# Patient Record
Sex: FEMALE | Race: WHITE
[De-identification: names, ages, dates, MRNs, and addresses within clinical notes are randomized per-mention and may not be internally consistent; named-entity substitution may affect disease eponyms.]

---

## 2019-03-06 ENCOUNTER — HOSPITAL ENCOUNTER (EMERGENCY)
Dept: HOSPITAL 75 - ER FS | Age: 79
LOS: 1 days | Discharge: HOME | End: 2019-03-07
Payer: MEDICARE

## 2019-03-06 VITALS — BODY MASS INDEX: 20.62 KG/M2 | WEIGHT: 105 LBS | HEIGHT: 60 IN

## 2019-03-06 DIAGNOSIS — E87.2: ICD-10-CM

## 2019-03-06 DIAGNOSIS — N39.0: Primary | ICD-10-CM

## 2019-03-06 DIAGNOSIS — F03.90: ICD-10-CM

## 2019-03-06 DIAGNOSIS — J40: ICD-10-CM

## 2019-03-06 PROCEDURE — 81000 URINALYSIS NONAUTO W/SCOPE: CPT

## 2019-03-06 PROCEDURE — 80053 COMPREHEN METABOLIC PANEL: CPT

## 2019-03-06 PROCEDURE — 71250 CT THORAX DX C-: CPT

## 2019-03-06 PROCEDURE — 85027 COMPLETE CBC AUTOMATED: CPT

## 2019-03-06 PROCEDURE — 87804 INFLUENZA ASSAY W/OPTIC: CPT

## 2019-03-06 PROCEDURE — 85730 THROMBOPLASTIN TIME PARTIAL: CPT

## 2019-03-06 PROCEDURE — 83605 ASSAY OF LACTIC ACID: CPT

## 2019-03-06 PROCEDURE — 85610 PROTHROMBIN TIME: CPT

## 2019-03-06 PROCEDURE — 85007 BL SMEAR W/DIFF WBC COUNT: CPT

## 2019-03-06 PROCEDURE — 71045 X-RAY EXAM CHEST 1 VIEW: CPT

## 2019-03-06 PROCEDURE — 36415 COLL VENOUS BLD VENIPUNCTURE: CPT

## 2019-03-06 PROCEDURE — 87088 URINE BACTERIA CULTURE: CPT

## 2019-03-06 PROCEDURE — 87040 BLOOD CULTURE FOR BACTERIA: CPT

## 2019-03-06 PROCEDURE — 51701 INSERT BLADDER CATHETER: CPT

## 2019-03-06 NOTE — ED GENERAL
General


Chief Complaint:  Cough/Cold/Flu Symptoms


Stated Complaint:  FEVER


Nursing Triage Note:  


pt sent from nursing home per ems for cough and fever, pt wtih hx of dementia 


and is confused but verbal.   at bedside and is able to answer questions


Nursing Sepsis Screen:  Possible Sepsis Risk


Exam Limitations:  Other (baseline dementia)





History of Present Illness


Date Seen by Provider:  Mar 6, 2019


Time Seen by Provider:  23:20


This is an 79-year-old female with a history of severe dementia who is brought 

to the emergency department from her nursing home for fever and cough first 

reported today.  Patient cannot provide any history herself due to baseline 

dementia. No other symptoms were reported to us however.





Allergies and Home Medications


Allergies


Coded Allergies:  


     No Known Drug Allergies (Unverified , 3/6/19)





Home Medications


Acetaminophen 325 Mg Tablet, PRN, (Reported)


Guaifenesin 400 Mg Tablet, 400 MG PO Q4H PRN for COUGH


   Prescribed by: AGUS WATT on 3/7/19 0208


Levofloxacin 750 Mg Tablet, 750 MG PO DAILY


   Prescribed by: AGUS WATT on 3/7/19 0208


Ondansetron HCl 4 Mg Tab, PRN, (Reported)


Oseltamivir Phosphate 75 Mg Cap, 75 MG PO BID


   Prescribed by: AGUS WATT on 3/7/19 0208


Polyethylene Glycol 3350 17 Gm Powd.pack, DAILY, (Reported)


Promethazine Hcl 12.5 Mg Tablet, PRN, (Reported)





Patient Home Medication List


Home Medication List Reviewed:  Yes





Review of Systems


Review of Systems


Constitutional:  other (review of systems is not obtainable secondary to 

dementia)





Past Medical-Social-Family Hx


Patient Social History


Recent Foreign Travel:  No


Contact w/Someone Who Travel:  No


Recent Infectious Disease Expo:  No


Physical Abuse:  No


Sexual Abuse:  No


Mistreated:  No


Fear:  No





Physical Exam


Vital Signs





Vital Signs - First Documented








 3/6/19 3/6/19





 23:15 23:26


 


Temp  99.6


 


Pulse  107


 


Resp  18


 


B/P (MAP)  150/79 (102)


 


Pulse Ox  95


 


O2 Delivery Room Air 





Capillary Refill : Less Than 3 Seconds


Height, Weight, BMI


Height: 5'"


Weight: 105lbs. oz. 47.285013kb;  BMI


Method:Stated


General Appearance:  No Apparent Distress


HEENT:  Normal ENT Inspection


Neck:  Supple; No JVD


Respiratory:  Other (mild coarse breath sounds bilaterally)


Cardiovascular:  Regular Rate, Rhythm, Normal Peripheral Pulses


Gastrointestinal:  Non Tender, Soft


Extremity:  Pedal Edema (mild, symmetrical)


Neurologic/Psychiatric:  Other (awake. Unable to cooperate with thorough 

neurologic testing however moves all 4 extremities grossly symmetrically, 

wiggles her toes on both sides after asking multiple times, no facial droop, 

says "no" when pressing on the skin of both feet, sensation appears to be 

intact symmetrically)


Skin:  Warm/Dry





Focused Exam


Lactate Level


3/6/19 00:05: Lactic Acid Level 2.15*H


3/7/19 01:54: Lactic Acid Level 2.10*H





Lactic Acid Level





Laboratory Tests








Test


 3/7/19


01:54


 


Lactic Acid Level


 2.10 MMOL/L


(0.50-2.00)  *H











Progress/Results/Core Measures


Suspected Sepsis


Recent Fever Within 48 Hours:  Yes


Infection Criteria Present:  Suspected New Infection


New/Unexplained  Altered Menta:  No


Sepsis Screen:  Possible Sepsis Risk


SIRS


Temperature:99.6 


Pulse: 107 


Respiratory Rate: 18


 


Laboratory Tests


3/6/19 00:05: White Blood Count 8.9


Blood Pressure 150 /79 


Mean: 102


 





3/6/19 00:05: Lactic Acid Level 2.15*H


3/7/19 01:54: Lactic Acid Level 2.10*H


Laboratory Tests


3/6/19 00:05: 


Creatinine 0.48L, INR Comment 1.1, Platelet Count 236, Total Bilirubin 0.2








Results/Orders


Lab Results





Laboratory Tests








Test


 3/6/19


00:05 3/6/19


00:45 3/7/19


01:54 Range/Units


 


 


White Blood Count


 8.9 


 


 


 4.3-11.0


10^3/uL


 


Red Blood Count


 3.55 L


 


 


 4.35-5.85


10^6/uL


 


Hemoglobin 10.8 L   11.5-16.0  G/DL


 


Hematocrit 34 L   35-52  %


 


Mean Corpuscular Volume 94    80-99  FL


 


Mean Corpuscular Hemoglobin 30    25-34  PG


 


Mean Corpuscular Hemoglobin


Concent 32 


 


 


 32-36  G/DL





 


Red Cell Distribution Width 14.4    10.0-14.5  %


 


Platelet Count


 236 


 


 


 130-400


10^3/uL


 


Mean Platelet Volume 9.2    7.4-10.4  FL


 


Neutrophils (%) (Auto) 84 H   42-75  %


 


Lymphocytes (%) (Auto) 7 L   12-44  %


 


Monocytes (%) (Auto) 7    0-12  %


 


Eosinophils (%) (Auto) 0    0-10  %


 


Basophils (%) (Auto) 0    0-10  %


 


Neutrophils # (Auto) 7.5    1.8-7.8  X 10^3


 


Lymphocytes # (Auto) 0.7 L   1.0-4.0  X 10^3


 


Monocytes # (Auto) 0.6    0.0-1.0  X 10^3


 


Eosinophils # (Auto)


 0.0 


 


 


 0.0-0.3


10^3/uL


 


Basophils # (Auto)


 0.0 


 


 


 0.0-0.1


10^3/uL


 


Neutrophils % (Manual) 73     %


 


Lymphocytes % (Manual) 8     %


 


Monocytes % (Manual) 5     %


 


Eosinophils % (Manual) 1     %


 


Basophils % (Manual) 0     %


 


Metamyelocytes % 1     %


 


Myelocytes % 1     %


 


Band Neutrophils 11     %


 


Prothrombin Time 13.8    12.2-14.7  SEC


 


INR Comment 1.1    0.8-1.4  


 


Activated Partial


Thromboplast Time 33 


 


 


 24-35  SEC





 


Sodium Level 140    135-145  MMOL/L


 


Potassium Level 3.7    3.6-5.0  MMOL/L


 


Chloride Level 102      MMOL/L


 


Carbon Dioxide Level 25    21-32  MMOL/L


 


Anion Gap 13    5-14  MMOL/L


 


Blood Urea Nitrogen 15    7-18  MG/DL


 


Creatinine


 0.48 L


 


 


 0.60-1.30


MG/DL


 


Estimat Glomerular Filtration


Rate > 60 


 


 


  





 


BUN/Creatinine Ratio 31     


 


Glucose Level 171 H     MG/DL


 


Lactic Acid Level


 2.15 *H


 


 2.10 *H


 0.50-2.00


MMOL/L


 


Calcium Level 8.6    8.5-10.1  MG/DL


 


Corrected Calcium 9.0    8.5-10.1  MG/DL


 


Total Bilirubin 0.2    0.1-1.0  MG/DL


 


Aspartate Amino Transf


(AST/SGOT) 13 


 


 


 5-34  U/L





 


Alanine Aminotransferase


(ALT/SGPT) 8 


 


 


 0-55  U/L





 


Alkaline Phosphatase 51      U/L


 


Total Protein 6.2 L   6.4-8.2  GM/DL


 


Albumin 3.5    3.2-4.5  GM/DL


 


Urine Color  YELLOW    


 


Urine Clarity  SL CLOUDY    


 


Urine pH  7.5   5-9  


 


Urine Specific Gravity  1.015 L  1.016-1.022  


 


Urine Protein  1+ H  NEGATIVE  


 


Urine Glucose (UA)  NEGATIVE   NEGATIVE  


 


Urine Ketones  TRACE H  NEGATIVE  


 


Urine Nitrite  NEGATIVE   NEGATIVE  


 


Urine Bilirubin  NEGATIVE   NEGATIVE  


 


Urine Urobilinogen  0.2   NORMAL  MG/DL


 


Urine Leukocyte Esterase  NEGATIVE   NEGATIVE  


 


Urine RBC (Auto)  2+ H  NEGATIVE  


 


Urine RBC  5-10 H   /HPF


 


Urine WBC  5-10 H   /HPF


 


Urine Squamous Epithelial


Cells 


 5-10 


 


  /HPF





 


Urine Crystals  NONE    /LPF


 


Urine Bacteria  LARGE H   /HPF


 


Urine Casts  NONE    /LPF


 


Urine Mucus  NONE    /LPF


 


Urine Culture Indicated  NO    








Micro Results





Microbiology


3/6/19 Influenza Types A,B Antigen (ABY) - Final, Complete


         





My Orders





Orders - AGUS WATT DO


Cbc With Automated Diff (3/6/19 23:28)


Comprehensive Metabolic Panel (3/6/19 23:28)


Blood Culture (3/6/19 23:28)


Urinalysis (3/6/19 23:28)


Urine Culture (3/6/19 23:28)


Protime With Inr (3/6/19 23:28)


Partial Thromboplastin Time (3/6/19 23:28)


Chest 1 View Ap/Pa Only (3/6/19 23:28)


Saline Lock/Iv-Start (3/6/19 23:28)


Saline Lock/Iv-Start (3/6/19 23:28)


Vital Signs Adult Sepsis Patie Q15M (3/6/19 23:28)


O2 (3/6/19 23:28)


Remove Rings In Anticipation O (3/6/19 23:28)


Lactic Acid Analyzer (3/6/19 23:28)


Influenza A And B Antigens (3/6/19 23:28)


Ns Iv 1000 Ml (Sodium Chloride 0.9%) (3/6/19 23:30)


Cefepime Injection (Maxipime Injection) (3/6/19 23:30)


Vancomycin Injection (Vancomycin Injecti (3/6/19 23:30)


Ct Chest Wo (3/7/19 00:51)


Manual Differential (3/6/19 00:05)


Oseltamivir  75 Mg Capsule (Tamiflu  75 (3/7/19 02:01)


Oseltamivir  75 Mg Capsule (Tamiflu  75 (3/7/19 02:10)





Medications Given in ED





Current Medications








 Medications  Dose


 Ordered  Sig/Haile


 Route  Start Time


 Stop Time Status Last Admin


Dose Admin


 


 Cefepime HCl 1000


 mg/Sterile Water  10 ml @ 


 200 mls/hr  ONCE  ONCE


 IV  3/6/19 23:30


 3/6/19 23:32 DC 3/7/19 00:35


200 MLS/HR


 


 Sodium Chloride  2,100 ml @ 


 2,100 mls/hr  ONCE  ONCE


 IV  3/6/19 23:30


 3/7/19 00:29 DC 3/7/19 00:35


2,100 MLS/HR


 


 Vancomycin HCl


 1000 mg/Sodium


 Chloride  250 ml @ 


 250 mls/hr  ONCE  ONCE


 IV  3/6/19 23:30


 3/7/19 00:29 DC 3/7/19 00:35


250 MLS/HR








Vital Signs/I&O











 3/6/19 3/6/19





 23:15 23:26


 


Temp  99.6


 


Pulse  107


 


Resp  18


 


B/P (MAP)  150/79 (102)


 


Pulse Ox  95


 


O2 Delivery Room Air Room Air





Capillary Refill : Less Than 3 Seconds








Blood Pressure Mean:  102








Progress Note :  


Progress Note


Patient is at risk for multidrug resistant organisms, she had a fever prior to 

arrival, she had a borderline temperature here although we did not obtain a 

rectal temperature. She has no leukocytosis but she does have a left shift. 

Lactic acid returned elevated greater than 2 but less than 4. Chest x-ray was 

abnormal, CT of the chest does not show evidence of pneumonia, however there is 

evidence of a urinary tract infection and I would empirically treat for 

influenza. Pt's  requests dc back to nursing home.  Will start empiric 

tamiflu and will treat with levaquin for UTI so as to cross cover for possible 

bacterial component to cough.





Departure


Impression





 Primary Impression:  


 UTI (urinary tract infection)


 Additional Impressions:  


 Lactic acidosis


 Bronchitis


Disposition:  01 HOME, SELF-CARE


Condition:  Stable





Departure-Patient Inst.


Referrals:  


NO,LOCAL PHYSICIAN (PCP)


Primary Care Physician


Patient Instructions:  Acute Bronchitis, Adult (DC)


Scripts


Guaifenesin (Guaifenesin) 400 Mg Tablet


400 MG PO Q4H PRN for COUGH for 7 Days, #30 TAB


   Prov: AGUS WATT DO         3/7/19 


Oseltamivir Phosphate (Tamiflu) 75 Mg Cap


75 MG PO BID for 5 Days, #9 CAP


   Prov: AGUS WATT DO         3/7/19 


Levofloxacin (Levofloxacin) 750 Mg Tablet


750 MG PO DAILY for 5 Days, #5 TAB


   Prov: AGUS WATT DO         3/7/19











AGUS WATT DO Mar 6, 2019 23:55

## 2019-03-07 VITALS — DIASTOLIC BLOOD PRESSURE: 71 MMHG | SYSTOLIC BLOOD PRESSURE: 129 MMHG

## 2019-03-07 LAB
ALBUMIN SERPL-MCNC: 3.5 GM/DL (ref 3.2–4.5)
ALP SERPL-CCNC: 51 U/L (ref 40–136)
ALT SERPL-CCNC: 8 U/L (ref 0–55)
APTT BLD: 33 SEC (ref 24–35)
APTT PPP: YELLOW S
BACTERIA #/AREA URNS HPF: (no result) /HPF
BASOPHILS # BLD AUTO: 0 10^3/UL (ref 0–0.1)
BASOPHILS NFR BLD AUTO: 0 % (ref 0–10)
BASOPHILS NFR BLD MANUAL: 0 %
BILIRUB SERPL-MCNC: 0.2 MG/DL (ref 0.1–1)
BILIRUB UR QL STRIP: NEGATIVE
BUN/CREAT SERPL: 31
CALCIUM SERPL-MCNC: 8.6 MG/DL (ref 8.5–10.1)
CHLORIDE SERPL-SCNC: 102 MMOL/L (ref 98–107)
CO2 SERPL-SCNC: 25 MMOL/L (ref 21–32)
CREAT SERPL-MCNC: 0.48 MG/DL (ref 0.6–1.3)
EOSINOPHIL # BLD AUTO: 0 10^3/UL (ref 0–0.3)
EOSINOPHIL NFR BLD AUTO: 0 % (ref 0–10)
EOSINOPHIL NFR BLD MANUAL: 1 %
ERYTHROCYTE [DISTWIDTH] IN BLOOD BY AUTOMATED COUNT: 14.4 % (ref 10–14.5)
FIBRINOGEN PPP-MCNC: (no result) MG/DL
GFR SERPLBLD BASED ON 1.73 SQ M-ARVRAT: > 60 ML/MIN
GLUCOSE SERPL-MCNC: 171 MG/DL (ref 70–105)
GLUCOSE UR STRIP-MCNC: NEGATIVE MG/DL
HCT VFR BLD CALC: 34 % (ref 35–52)
HGB BLD-MCNC: 10.8 G/DL (ref 11.5–16)
INR PPP: 1.1 (ref 0.8–1.4)
KETONES UR QL STRIP: (no result)
LEUKOCYTE ESTERASE UR QL STRIP: NEGATIVE
LYMPHOCYTES # BLD AUTO: 0.7 X 10^3 (ref 1–4)
LYMPHOCYTES NFR BLD AUTO: 7 % (ref 12–44)
MANUAL DIFFERENTIAL PERFORMED BLD QL: YES
MCH RBC QN AUTO: 30 PG (ref 25–34)
MCHC RBC AUTO-ENTMCNC: 32 G/DL (ref 32–36)
MCV RBC AUTO: 94 FL (ref 80–99)
METAMYELOCYTES NFR BLD: 1 %
MONOCYTES # BLD AUTO: 0.6 X 10^3 (ref 0–1)
MONOCYTES NFR BLD AUTO: 7 % (ref 0–12)
MONOCYTES NFR BLD: 5 %
MYELOCYTES NFR BLD: 1 %
NEUTROPHILS # BLD AUTO: 7.5 X 10^3 (ref 1.8–7.8)
NEUTROPHILS NFR BLD AUTO: 84 % (ref 42–75)
NEUTS BAND NFR BLD MANUAL: 73 %
NEUTS BAND NFR BLD: 11 %
NITRITE UR QL STRIP: NEGATIVE
PH UR STRIP: 7.5 [PH] (ref 5–9)
PLATELET # BLD: 236 10^3/UL (ref 130–400)
PMV BLD AUTO: 9.2 FL (ref 7.4–10.4)
POTASSIUM SERPL-SCNC: 3.7 MMOL/L (ref 3.6–5)
PROT SERPL-MCNC: 6.2 GM/DL (ref 6.4–8.2)
PROT UR QL STRIP: (no result)
PROTHROMBIN TIME: 13.8 SEC (ref 12.2–14.7)
RBC #/AREA URNS HPF: (no result) /HPF
SODIUM SERPL-SCNC: 140 MMOL/L (ref 135–145)
SP GR UR STRIP: 1.01 (ref 1.02–1.02)
SQUAMOUS #/AREA URNS HPF: (no result) /HPF
UROBILINOGEN UR-MCNC: 0.2 MG/DL
VARIANT LYMPHS NFR BLD MANUAL: 8 %
WBC # BLD AUTO: 8.9 10^3/UL (ref 4.3–11)
WBC #/AREA URNS HPF: (no result) /HPF

## 2019-03-07 NOTE — DIAGNOSTIC IMAGING REPORT
Indication: Cough and fever.



Up-regulation of comparison.



Findings: There are patchy bibasal infiltrates. Evan. Some

minimal venous congestion. No pleural effusion or pneumothorax

previous unremarkable



Impression: Patchy bibasal trace left greater than right. Early

pneumonia cannot excluded.



Evan and some minimal central venous congestion



Dictated by: 



  Dictated on workstation # [id number]

## 2020-04-24 ENCOUNTER — HOSPITAL ENCOUNTER (OUTPATIENT)
Dept: HOSPITAL 75 - LAB FS | Age: 80
End: 2020-04-24
Attending: PEDIATRICS
Payer: MEDICARE

## 2020-04-24 DIAGNOSIS — F03.90: ICD-10-CM

## 2020-04-24 DIAGNOSIS — E03.9: Primary | ICD-10-CM

## 2020-04-24 DIAGNOSIS — I10: ICD-10-CM

## 2020-04-24 LAB
ALBUMIN SERPL-MCNC: 3.5 GM/DL (ref 3.2–4.5)
ALP SERPL-CCNC: 52 U/L (ref 40–136)
ALT SERPL-CCNC: 9 U/L (ref 0–55)
BILIRUB SERPL-MCNC: 0.2 MG/DL (ref 0.1–1)
BUN/CREAT SERPL: 38
CALCIUM SERPL-MCNC: 9.1 MG/DL (ref 8.5–10.1)
CHLORIDE SERPL-SCNC: 103 MMOL/L (ref 98–107)
CO2 SERPL-SCNC: 25 MMOL/L (ref 21–32)
CREAT SERPL-MCNC: 0.48 MG/DL (ref 0.6–1.3)
GFR SERPLBLD BASED ON 1.73 SQ M-ARVRAT: > 60 ML/MIN
GLUCOSE SERPL-MCNC: 90 MG/DL (ref 70–105)
POTASSIUM SERPL-SCNC: 4.4 MMOL/L (ref 3.6–5)
PROT SERPL-MCNC: 5.7 GM/DL (ref 6.4–8.2)
SODIUM SERPL-SCNC: 140 MMOL/L (ref 135–145)
T4 FREE SERPL-MCNC: 1.02 NG/DL (ref 0.7–1.48)
VALPROATE SERPL-MCNC: 17.8 UG/ML (ref 50–100)

## 2020-04-24 PROCEDURE — 84439 ASSAY OF FREE THYROXINE: CPT

## 2020-04-24 PROCEDURE — 36415 COLL VENOUS BLD VENIPUNCTURE: CPT

## 2020-04-24 PROCEDURE — 80053 COMPREHEN METABOLIC PANEL: CPT

## 2020-04-24 PROCEDURE — 84443 ASSAY THYROID STIM HORMONE: CPT

## 2020-04-24 PROCEDURE — 80164 ASSAY DIPROPYLACETIC ACD TOT: CPT

## 2021-10-05 ENCOUNTER — HOSPITAL ENCOUNTER (OUTPATIENT)
Dept: HOSPITAL 75 - LAB FS | Age: 81
End: 2021-10-05
Attending: PEDIATRICS
Payer: MEDICARE

## 2021-10-05 DIAGNOSIS — Z01.89: Primary | ICD-10-CM

## 2021-10-05 LAB
APTT PPP: YELLOW S
BACTERIA #/AREA URNS HPF: (no result) /HPF
BILIRUB UR QL STRIP: NEGATIVE
FIBRINOGEN PPP-MCNC: (no result) MG/DL
GLUCOSE UR STRIP-MCNC: NEGATIVE MG/DL
KETONES UR QL STRIP: NEGATIVE
LEUKOCYTE ESTERASE UR QL STRIP: (no result)
NITRITE UR QL STRIP: POSITIVE
PH UR STRIP: 8 [PH] (ref 5–9)
PROT UR QL STRIP: NEGATIVE
SP GR UR STRIP: 1.02 (ref 1.02–1.02)
SQUAMOUS #/AREA URNS HPF: (no result) /HPF

## 2021-10-05 PROCEDURE — 87077 CULTURE AEROBIC IDENTIFY: CPT

## 2021-10-05 PROCEDURE — 81000 URINALYSIS NONAUTO W/SCOPE: CPT

## 2021-10-05 PROCEDURE — 87186 SC STD MICRODIL/AGAR DIL: CPT

## 2021-10-05 PROCEDURE — 87088 URINE BACTERIA CULTURE: CPT

## 2022-01-10 ENCOUNTER — HOSPITAL ENCOUNTER (EMERGENCY)
Dept: HOSPITAL 75 - ER FS | Age: 82
Discharge: HOME | End: 2022-01-10
Payer: MEDICARE

## 2022-01-10 VITALS — SYSTOLIC BLOOD PRESSURE: 136 MMHG | DIASTOLIC BLOOD PRESSURE: 71 MMHG

## 2022-01-10 DIAGNOSIS — E03.9: ICD-10-CM

## 2022-01-10 DIAGNOSIS — Z73.0: ICD-10-CM

## 2022-01-10 DIAGNOSIS — U07.1: Primary | ICD-10-CM

## 2022-01-10 DIAGNOSIS — F03.90: ICD-10-CM

## 2022-01-10 DIAGNOSIS — Z51.5: ICD-10-CM

## 2022-01-10 DIAGNOSIS — Z79.899: ICD-10-CM

## 2022-01-10 LAB
ALBUMIN SERPL-MCNC: 3.8 GM/DL (ref 3.2–4.5)
ALP SERPL-CCNC: 63 U/L (ref 40–136)
ALT SERPL-CCNC: 12 U/L (ref 0–55)
BASOPHILS # BLD AUTO: 0 10^3/UL (ref 0–0.1)
BASOPHILS NFR BLD AUTO: 0 % (ref 0–10)
BASOPHILS NFR BLD MANUAL: 1 %
BILIRUB SERPL-MCNC: 0.3 MG/DL (ref 0.1–1)
BUN/CREAT SERPL: 32
CALCIUM SERPL-MCNC: 9 MG/DL (ref 8.5–10.1)
CHLORIDE SERPL-SCNC: 98 MMOL/L (ref 98–107)
CO2 SERPL-SCNC: 23 MMOL/L (ref 21–32)
CREAT SERPL-MCNC: 0.37 MG/DL (ref 0.6–1.3)
EOSINOPHIL # BLD AUTO: 0 10^3/UL (ref 0–0.3)
EOSINOPHIL NFR BLD AUTO: 0 % (ref 0–10)
EOSINOPHIL NFR BLD MANUAL: 0 %
GFR SERPLBLD BASED ON 1.73 SQ M-ARVRAT: 167 ML/MIN
GLUCOSE SERPL-MCNC: 138 MG/DL (ref 70–105)
HCT VFR BLD CALC: 40 % (ref 35–52)
HGB BLD-MCNC: 13.2 G/DL (ref 11.5–16)
LYMPHOCYTES # BLD AUTO: 0.8 X 10^3 (ref 1–4)
LYMPHOCYTES NFR BLD AUTO: 5 % (ref 12–44)
MANUAL DIFFERENTIAL PERFORMED BLD QL: YES
MCH RBC QN AUTO: 30 PG (ref 25–34)
MCHC RBC AUTO-ENTMCNC: 33 G/DL (ref 32–36)
MCV RBC AUTO: 91 FL (ref 80–99)
MONOCYTES # BLD AUTO: 1.1 X 10^3 (ref 0–1)
MONOCYTES NFR BLD AUTO: 7 % (ref 0–12)
MONOCYTES NFR BLD: 5 %
NEUTROPHILS # BLD AUTO: 13.1 X 10^3 (ref 1.8–7.8)
NEUTROPHILS NFR BLD AUTO: 87 % (ref 42–75)
NEUTS BAND NFR BLD MANUAL: 56 %
NEUTS BAND NFR BLD: 32 %
PLATELET # BLD EST: NORMAL 10*3/UL
PLATELET # BLD: 306 10^3/UL (ref 130–400)
PMV BLD AUTO: 8.9 FL (ref 9–12.2)
POTASSIUM SERPL-SCNC: 3.8 MMOL/L (ref 3.6–5)
PROT SERPL-MCNC: 6.7 GM/DL (ref 6.4–8.2)
RBC MORPH BLD: NORMAL
SODIUM SERPL-SCNC: 135 MMOL/L (ref 135–145)
VARIANT LYMPHS NFR BLD MANUAL: 2 %
VARIANT LYMPHS NFR BLD MANUAL: 4 %
WBC # BLD AUTO: 15.1 10^3/UL (ref 4.3–11)

## 2022-01-10 PROCEDURE — 85027 COMPLETE CBC AUTOMATED: CPT

## 2022-01-10 PROCEDURE — 71045 X-RAY EXAM CHEST 1 VIEW: CPT

## 2022-01-10 PROCEDURE — 85007 BL SMEAR W/DIFF WBC COUNT: CPT

## 2022-01-10 PROCEDURE — 80053 COMPREHEN METABOLIC PANEL: CPT

## 2022-01-10 PROCEDURE — 36415 COLL VENOUS BLD VENIPUNCTURE: CPT

## 2022-01-10 NOTE — XMS REPORT
Clinical Summary

                             Created on: 01/10/2022



Sara Suarez

External Reference #: [id number]

: 1940

Sex: Female



Demographics





                          Address                   [address] [place] [address]  [address]

 

                          Home Phone                [phone]

 

                          Preferred Language        English

 

                          Marital Status            

 

                          Gnosticist Affiliation     Unknown

 

                          Race                      White

 

                          Ethnic Group              Not  or 





Author





                          Author                    [facility]

 

                          Organization              [facility]

 

                          Address                   Unknown

 

                          Phone                     Unavailable







Support





                Name            Relationship    Address         Phone

 

                Robbin Suarez ECON            Unknown         [phone]







Care Team Providers





                    Care Team Member Name Role                Phone

 

                    Bucky Muhammad MD Unavailable         [phone]

 

                    Mychart, Generic Provider Unavailable         Unavailable

 

                    Nader Khan MD      PCP                 [phone]

 

                    Elysia Rodriguez-NP Unavailable         [phone]







Source Comments

Some departments are not documenting in the electronic medical record.  If you d
o not see the information that you expected, contact Release of Information in [facility] Health Information Management department at [phone] for further assistan
ce in locating additional records.[facility]



Allergies





                                        Comments



                 Active Allergy  Reactions       Severity        Noted Date 

 

                                         



                 Morphine        HIVES           Medium          10/13/2015 







Medications





                          End Date                  Status



              Medication   Sig          Dispensed    Refills      Start  



                                         Date  

 

                                                    Active



                     fluticasone (FLONASE) 50  Apply 2             0   



                           mcg/actuation nasal spray  Sprays to     



                                         each nostril     



                                         as directed     



                                         daily.     

 

                                                    Active



                     levothyroxine (SYNTHROID)  Take 25 mcg         0   



                           25 mcg tablet             by mouth     



                                         daily.     

 

                                                    Active



                     aspirin EC (ASPIR-ATTILA)  Take 325 mg         0   



                           325 mg tablet             by mouth     



                                         daily.     

 

                                                    Active



                     MULTIVIT            Take  by            0   



                           &MINERALS/FERROUS FUM     mouth.     



                                         (MULTI VITAMIN PO)      

 

                                                    Active



                     Cyanocobalamin 1,000 mcg  Place  under        0   



                           subl                      tongue.     

 

                                                    Active



                     raloxifene (EVISTA) 60 mg  Take 60 mg by       0   



                           tablet                    mouth daily     



                                         before     



                                         breakfast.     

 

                                                    Active



                     cholecalciferol(+)  Take 2,000          0   



                           (VITAMIN D-3) 2,000 unit  Units by     



                           tablet                    mouth daily.     

 

                                                    Active



                     vitamin E 400 unit  Take 400            0   



                           capsule                   Units by     



                                         mouth daily.     

 

                                                    Active



                     folic acid (FOLVITE) 1 mg  Take 1 mg by        0   



                           tablet                    mouth daily.     

 

                                                    Active



              donepezil (ARICEPT) 5 mg  Take 1 Tab by  180 Tab      3           

   



                     tablet              mouth twice         5  



                                         daily.     

 

                                                    Active



              sertraline (ZOLOFT) 50 mg  Take 1 Tab by  30 Tab       5          

    



                     tablet              mouth daily.        5  

 

                                                    Active



              QUEtiapine (SEROQUEL) 25  Take 1 Tab by  270 Tab      3           

   



                     mg tablet           mouth three         5  



                                         times daily.     

 

                                                    Active



              LORazepam (ATIVAN) 1 mg  Take 1 tablet  30 Tab       0            

  



                     tablet              every 12            5  



                                         hours as     



                                         needed for     



                                         anxiety.     







Active Problems





 



                           Problem                   Noted Date

 

 



                           Alzheimer disease         10/13/2015

 

 



                                         Overview:



                                         Formatting of this note might be differ

ent from the original.



                                         Onset of cognitive issues around the ag

e of 72 with progressive decline



                                         interfering with daily function.  Most 

consistent with probable AD.





                                         10/2015: MMSE 9, LMI 0, LMII 0 --> sugg

est amnestic and global cognitive



                                         impairment c/w AD.





                                         Last Assessment & Plan:



                                         Formatting of this note might be differ

ent from the original.



                                         1)  vitamin B12 and TSH today.



                                         2) we will obtain an MRI in [facility]

nd send the results to us (CD of the



                                         scan to Dr. Muhammad).  Order given to the

m



                                         3) Start citalopram 10mg a day for depr

essive symptoms.



                                         4) continue aricept at 5mg a day.



                                         5) Return to see us in about 3 months







Medical History





  



                     Medical History     Date                Comments

 

  



                                         Disorganized thinking  

 

  



                                         Memory loss  

 

  



                                         Anxiety  







Social History





                                        Date



                 Tobacco Use     Types           Packs/Day       Years Used 

 

                                         



                                         Never Smoker    

 

    



                                         Smokeless Tobacco: Never   



                                         Used   







                                        Comments



                           Alcohol Use               Standard Drinks/Week 

 

                                         



                           No                        0 (1 standard drink = 0.6 o

z pure alcohol) 







 



                           Sex Assigned at Birth     Date Recorded

 

 



                                         Not on file 







Last Filed Vital Signs





                    Reading             Time Taken          Comments



                                         Vital Sign   

 

                    121/68              10/13/2015 12:49 PM CDT  



                                         Blood Pressure   

 

                    84                  10/13/2015 12:49 PM CDT  



                                         Pulse   

 

                    -                   -                    



                                         Temperature   

 

                    -                   -                    



                                         Respiratory Rate   

 

                    -                   -                    



                                         Oxygen Saturation   

 

                    -                   -                    



                                         Inhaled Oxygen   



                                         Concentration   

 

                    39 kg (86 lb)       10/13/2015 12:49 PM CDT  



                                         Weight   

 

                    149.9 cm (4' 11")   10/13/2015 12:49 PM CDT  



                                         Height   

 

                    17.37               10/13/2015 12:49 PM CDT  



                                         Body Mass Index   







Plan of Treatment





   



                 Health Maintenance  Due Date        Last Done       Comments

 

   



                           MEDICARE ANNUAL WELLNESS  1940  



                                         VISIT   

 

   



                           DTAP/TDAP VACCINES (1 -   1956  



                                         Tdap)   

 

   



                           PHYSICAL (COMPREHENSIVE)  1956  



                                         EXAM   

 

   



                           SHINGLES RECOMBINANT      1988  



                                         VACCINE (1 of 2)   

 

   



                           OSTEOPOROSIS              2003  



                                         SCREENING/MONITORING   

 

   



                           PNEUMONIA (PPSV23)        2003  



                                         VACCINE (1 of 1 - PPSV23)   

 

   



                           INFLUENZA VACCINE         2021  







Results

Not on filefrom Last 3 Months



Insurance





                                        Type



            Payer      Benefit    Subscriber ID  Effective  Phone      Address 



                           Plan /                    Dates   



                                         Group     

 

                                        Medicare



            MEDICARE   MEDICARE   [id number]  2003-P  [phone]  [address] 



                     [address]              [id number] 



                           [address]                         [place] 



                                         [address] 

 

                                        PPO



            [employer]  [employer]     [id number]  2015-P  [phone]

[address] 



                     INSURANCE           [place]              [id number] 



                           [place]                        [place] 



                                         [address] 







     



            Guarantor Name  Account    Relation to  Date of    Phone      Billin

g Address



                     Type                Patient             Birth  

 

     



            Sara Suarez  Personal/F  Self       1940  [phone]  [address] [facility]               (Home)              [place], [place] [address]

[phone] 



                                         (Work) 







Advance Directives





                          Patient Representative    Explanation



                           Type                      Date Recorded  

 

                                                     



                           Advance                   10/13/2015  1:52 PM  



                                         Directive/DPOA   







Care Teams





                          Start Date                End Date



                     Team Member         Relationship        Specialty  

 

                          10/20/15                   



                     Nader Khan MD     PCP - General       Pediatrics  



                                         [address] [place] [address]    



                                         [phone] (Work)    



                                         [phone] (Fax)    

 

                          10/13/15                   



                           Bucky Muhammad MD      Neurology  



                                         [address] [facility]    



                                         [address]    



                                         [place] [address]    



                                         [phone] (Work)    



                                         [phone] (Fax)    

 

                          10/19/15                   



                                         Mychart, Generic Provider    

 

                          11/23/15                   



                           Elysia Rodriguez, APRN-NP   Neurology  



                                         [address] [facility]    



                                         [address]    



                                         [place] [address]    



                                         [phone] (Work)    



                                         [phone] (Fax)
No

## 2022-01-10 NOTE — ED FEVER
History of Present Illness


General


Stated Complaint:  FEVER





History of Present Illness


Date Seen by Provider:  Chucho 10, 2022


Time Seen by Provider:  14:43


Initial Comments


81-year-old female brought in by EMS.  Patient was sent due to fever and 

decreased responsiveness.  Patient has known dementia and has not been verbal 

for at least 5 years.  She has some hand and feet contractures that are chronic.

 Patient yesterday with kind of awaken and eat.  Today however she has had a 

fever and will not awaken to eat or respond anything.  Patient tested positive 

for Covid this morning.





Allergies and Home Medications


Allergies


Coded Allergies:  


     No Known Drug Allergies (Unverified , 3/6/19)





Patient Home Medication List


Home Medication List Reviewed:  Yes


Acetaminophen (Tylenol) 325 Mg Tablet, PRN, (Reported)


   Entered as Reported by: REBA FREEDMAN on 3/7/19 0106


Guaifenesin (Guaifenesin) 400 Mg Tablet, 400 MG PO Q4H PRN for COUGH


   Prescribed by: AGUS WATT on 3/7/19 0208


Levofloxacin (Levofloxacin) 750 Mg Tablet, 750 MG PO DAILY


   Prescribed by: AGUS WATT on 3/7/19 0208


Ondansetron HCl (Zofran) 4 Mg Tab, PRN, (Reported)


   Entered as Reported by: REBA FREEDMAN on 3/7/19 0106


Oseltamivir Phosphate (Tamiflu) 75 Mg Cap, 75 MG PO BID


   Prescribed by: AGUS WATT on 3/7/19 0208


Polyethylene Glycol 3350 (Miralax) 17 Gm Powd.pack, DAILY, (Reported)


   Entered as Reported by: REBA FREEDMAN on 3/7/19 0106


Promethazine Hcl (Phenergan) 12.5 Mg Tablet, PRN, (Reported)


   Entered as Reported by: REBA FREEDMAN on 3/7/19 0106


[Alprazolam   Tab 0.5MG]  , (Reported)


   Entered as Reported by: REBA FREEDMAN on 3/7/19 0056


[Aspirin      Tab 325MG]  , (Reported)


   Entered as Reported by: REBA FREEDMAN on 3/7/19 0033


[Citalopram   Tab 10MG]  , (Reported)


   Entered as Reported by: REBA FREEDMAN on 3/7/19 0056


[Divalproex   Cap 125MG]  , (Reported)


   Entered as Reported by: REBA FREEDMAN on 3/7/19 0056


[Docusate Sod Tab 100MG]  , (Reported)


   Entered as Reported by: REBA FREEDMAN on 3/7/19 0056


[Furosemide   Tab 20MG]  , (Reported)


   Entered as Reported by: REBA FREEDMAN on 3/7/19 0056


[Levothyroxin Tab 50MCG]  , (Reported)


   Entered as Reported by: REBA FREEDMAN on 3/7/19 0056


[Memantine    Tab Hcl 5MG]  , (Reported)


   Entered as Reported by: REBA FREEDMAN on 3/7/19 0056


[Omeprazole   Cap 20MG]  , (Reported)


   Entered as Reported by: REBA FREEDMAN on 3/7/19 0056


[Ondansetron  Tab 4MG]  , (Reported)


   Entered as Reported by: REBA FREEDMAN on 3/7/19 0056


[Raloxifene   Tab 60MG]  , (Reported)


   Entered as Reported by: REBA FREEDMAN on 3/7/19 0033


[Risperidone  Tab 0.25MG]  , (Reported)


   Entered as Reported by: REBA FREEDMAN on 3/7/19 0056


[Rivastigmine Cap 4.5MG]  , (Reported)


   Entered as Reported by: REBA FREEDAMN on 3/7/19 0056





Review of Systems


Review of Systems


Constitutional:  see HPI, fever


EENTM:  no symptoms reported


Respiratory:  no symptoms reported


Cardiovascular:  no symptoms reported


Gastrointestinal:  no symptoms reported


Genitourinary:  no symptoms reported


Musculoskeletal:  no symptoms reported


Skin:  no symptoms reported


Psychiatric/Neurological:  See HPI


Patient nonverbal and HPI limited with information provided by her 





Past Medical-Social-Family Hx


Seasonal Allergies


Seasonal Allergies:  No





Past Medical History


Surgeries:  No


Respiratory:  No


Cardiac:  No


Neurological:  Yes


Dementia


Genitourinary:  No


Gastrointestinal:  No


Musculoskeletal:  No


Endocrine:  Yes


Hypothyroidsim


HEENT:  No


Cancer:  No


Psychosocial:  No


Integumentary:  No


Blood Disorders:  No





Physical Exam





Vital Signs - First Documented




















Capillary Refill :


Height: 5'"


Weight: 105lbs. oz. 47.983713jc;  BMI


Method:Stated


General Appearance:  other (Chronically ill, known contractures of upper and 

lower extremities, patient does not awaken or respond to stimulation)


Respiratory:  lungs clear, normal breath sounds


Cardiovascular:  normal peripheral pulses, regular rate, rhythm


Neurologic/Psychiatric:  other (Baseline dementia, no nonverbal, does not awaken

or respond to stimuli)


Skin:  normal color, warm/dry





Progress/Results/Core Measures


Suspected Sepsis


SIRS


Temperature: 


Pulse:  


Respiratory Rate: 


 


Laboratory Tests


1/10/22 14:45: White Blood Count 15.1H


Blood Pressure  / 


Mean: 


 





Laboratory Tests


1/10/22 14:45: 


Creatinine 0.37L, Platelet Count 306, Total Bilirubin 0.3








Results/Orders


Lab Results





Laboratory Tests








Test


 1/10/22


14:45 Range/Units


 


 


White Blood Count


 15.1 H


 4.3-11.0


10^3/uL


 


Red Blood Count


 4.38 


 3.80-5.11


10^6/uL


 


Hemoglobin 13.2  11.5-16.0  g/dL


 


Hematocrit 40  35-52  %


 


Mean Corpuscular Volume 91  80-99  fL


 


Mean Corpuscular Hemoglobin 30  25-34  pg


 


Mean Corpuscular Hemoglobin


Concent 33 


 32-36  g/dL





 


Red Cell Distribution Width 14.8 H 10.0-14.5  %


 


Platelet Count


 306 


 130-400


10^3/uL


 


Mean Platelet Volume 8.9 L 9.0-12.2  fL


 


Neutrophils (%) (Auto) 87 H 42-75  %


 


Lymphocytes (%) (Auto) 5 L 12-44  %


 


Monocytes (%) (Auto) 7  0-12  %


 


Eosinophils (%) (Auto) 0  0-10  %


 


Basophils (%) (Auto) 0  0-10  %


 


Neutrophils # (Auto) 13.1 H 1.8-7.8  X 10^3


 


Lymphocytes # (Auto) 0.8 L 1.0-4.0  X 10^3


 


Monocytes # (Auto) 1.1 H 0.0-1.0  X 10^3


 


Eosinophils # (Auto)


 0.0 


 0.0-0.3


10^3/uL


 


Basophils # (Auto)


 0.0 


 0.0-0.1


10^3/uL


 


Neutrophils % (Manual) 56   %


 


Lymphocytes % (Manual) 4   %


 


Monocytes % (Manual) 5   %


 


Eosinophils % (Manual) 0   %


 


Basophils % (Manual) 1   %


 


Band Neutrophils 32   %


 


Atypical Lymphocytes 2   %


 


Platelet Estimate NORMAL   


 


Blood Morphology Comment NORMAL   


 


Sodium Level 135  135-145  MMOL/L


 


Potassium Level 3.8  3.6-5.0  MMOL/L


 


Chloride Level 98    MMOL/L


 


Carbon Dioxide Level 23  21-32  MMOL/L


 


Anion Gap 14  5-14  MMOL/L


 


Blood Urea Nitrogen 12  7-18  MG/DL


 


Creatinine


 0.37 L


 0.60-1.30


MG/DL


 


Estimat Glomerular Filtration


Rate 167 


  





 


BUN/Creatinine Ratio 32   


 


Glucose Level 138 H   MG/DL


 


Calcium Level 9.0  8.5-10.1  MG/DL


 


Corrected Calcium 9.2  8.5-10.1  MG/DL


 


Total Bilirubin 0.3  0.1-1.0  MG/DL


 


Aspartate Amino Transf


(AST/SGOT) 19 


 5-34  U/L





 


Alanine Aminotransferase


(ALT/SGPT) 12 


 0-55  U/L





 


Alkaline Phosphatase 63    U/L


 


Total Protein 6.7  6.4-8.2  GM/DL


 


Albumin 3.8  3.2-4.5  GM/DL








My Orders





Orders - SHEYLA ARIAS DO


Cbc With Automated Diff (1/10/22 14:51)


Comprehensive Metabolic Panel (1/10/22 14:51)


Ed Iv/Invasive Line Start (1/10/22 14:51)


Ns Iv 500 Ml (Sodium Chloride 0.9%) (1/10/22 15:00)


Chest 1 View Ap/Pa Only (1/10/22 14:51)


Manual Differential (1/10/22 14:45)





Medications Given in ED





Current Medications








 Medications  Dose


 Ordered  Sig/Haile


 Route  Start Time


 Stop Time Status Last Admin


Dose Admin


 


 Sodium Chloride  500 ml @ 0


 mls/hr  Q0M ONCE


 IV  1/10/22 15:00


 1/10/22 15:01 DC 1/10/22 15:08


1,000 MLS/HR








Vital Signs/I&O











 1/10/22 1/10/22





 14:40 14:40


 


Temp 36.8 


 


Pulse 86 


 


Resp 18 


 


B/P (MAP) 134/70 (91) 


 


Pulse Ox 94 


 


O2 Delivery Nasal Cannula Nasal Cannula


 


O2 Flow Rate 2.00 2.00





Capillary Refill :


Progress Note :  


Progress Note


I had a long in-depth conversation with patient's  concerning care goals 

and probable outcomes.  After discussion and him spending some time thinking 

about it.  He would like her to be discharged back to the care center on 

hospice.  I called and discussed with Dr. Nader Khan who will arrange for her 

hospice care.  Patient will be discharged back in stable condition but likely to

rapidly decline





Diagnostic Imaging





   Diagonstic Imaging:  Xray


   Plain Films/CT/US/NM/MRI:  chest


Comments


CHEST 1 VIEW AP/PA ONLY








INDICATION: Fever.





TIME OF EXAM: 2:56 PM





CORRELATION is made with prior chest radiograph from 03/06/2019.





FINDINGS: The heart size is stable. There is some prominence to


the aortic knob, similar to prior study. There appears to be some


infiltrate in the left lung base partially obscuring the left


hemidiaphragm. The right lung appears clear. The pulmonary


vascularity is normal. No effusion or pneumothorax is seen.





IMPRESSION: Findings suggestive of left basilar infiltrate.





Departure


Impression





   Primary Impression:  


   Advancing dementia


   Additional Impressions:  


   COVID-19


   Transitioned from acute care to hospice


Disposition:  01 HOME, SELF-CARE


Condition:  Stable





Departure-Patient Inst.


Referrals:  


NO,LOCAL PHYSICIAN (PCP/Family)


Primary Care Physician


Patient Instructions:  Palliative Care





Add. Discharge Instructions:  


Dr. Khan will arrange for hospice consult and transition











SHEYLA ARIAS DO               Chucho 10, 2022 14:47

## 2022-01-10 NOTE — DIAGNOSTIC IMAGING REPORT
INDICATION: Fever.



TIME OF EXAM: 2:56 PM



CORRELATION is made with prior chest radiograph from 03/06/2019.



FINDINGS: The heart size is stable. There is some prominence to

the aortic knob, similar to prior study. There appears to be some

infiltrate in the left lung base partially obscuring the left

hemidiaphragm. The right lung appears clear. The pulmonary

vascularity is normal. No effusion or pneumothorax is seen.



IMPRESSION: Findings suggestive of left basilar infiltrate.



Dictated by: 



  Dictated on workstation # [id number]

## 2022-02-06 ENCOUNTER — HOSPITAL ENCOUNTER (OUTPATIENT)
Dept: HOSPITAL 75 - LAB FS | Age: 82
End: 2022-02-06
Attending: PEDIATRICS
Payer: MEDICARE

## 2022-02-06 DIAGNOSIS — Z01.89: Primary | ICD-10-CM

## 2022-02-06 LAB
APTT PPP: YELLOW S
BACTERIA #/AREA URNS HPF: (no result) /HPF
BILIRUB UR QL STRIP: NEGATIVE
FIBRINOGEN PPP-MCNC: (no result) MG/DL
GLUCOSE UR STRIP-MCNC: NEGATIVE MG/DL
KETONES UR QL STRIP: (no result)
LEUKOCYTE ESTERASE UR QL STRIP: NEGATIVE
NITRITE UR QL STRIP: NEGATIVE
PH UR STRIP: 6.5 [PH] (ref 5–9)
PROT UR QL STRIP: NEGATIVE
RBC #/AREA URNS HPF: (no result) /HPF
SP GR UR STRIP: 1.02 (ref 1.02–1.02)
WBC #/AREA URNS HPF: (no result) /HPF

## 2022-02-06 PROCEDURE — 87088 URINE BACTERIA CULTURE: CPT

## 2022-02-06 PROCEDURE — 81000 URINALYSIS NONAUTO W/SCOPE: CPT

## 2022-02-06 PROCEDURE — 87077 CULTURE AEROBIC IDENTIFY: CPT

## 2022-02-09 ENCOUNTER — HOSPITAL ENCOUNTER (OUTPATIENT)
Dept: HOSPITAL 75 - IHC | Age: 82
End: 2022-02-09
Attending: PEDIATRICS
Payer: MEDICARE

## 2022-02-09 DIAGNOSIS — G31.1: Primary | ICD-10-CM

## 2022-02-09 LAB
AMORPH SED URNS QL MICRO: (no result) /LPF
APTT PPP: YELLOW S
BACTERIA #/AREA URNS HPF: (no result) /HPF
BILIRUB UR QL STRIP: NEGATIVE
FIBRINOGEN PPP-MCNC: (no result) MG/DL
GLUCOSE UR STRIP-MCNC: NEGATIVE MG/DL
KETONES UR QL STRIP: NEGATIVE
LEUKOCYTE ESTERASE UR QL STRIP: NEGATIVE
NITRITE UR QL STRIP: NEGATIVE
PH UR STRIP: 6 [PH] (ref 5–9)
PROT UR QL STRIP: NEGATIVE
RBC #/AREA URNS HPF: (no result) /HPF
SP GR UR STRIP: 1.02 (ref 1.02–1.02)
SQUAMOUS #/AREA URNS HPF: (no result) /HPF
WBC #/AREA URNS HPF: (no result) /HPF

## 2022-02-09 PROCEDURE — 81000 URINALYSIS NONAUTO W/SCOPE: CPT

## 2022-02-09 PROCEDURE — 87088 URINE BACTERIA CULTURE: CPT

## 2024-04-22 NOTE — DIAGNOSTIC IMAGING REPORT
PROCEDURE: CT chest without contrast.



TECHNIQUE: Multiple contiguous axial images were obtained through

the chest without the use of intravenous contrast.



INDICATION:  Cough and fever with confusion



There is no previous study for comparison.



There is mild dependent atelectasis and/or pneumonitis in the

lung bases. No consolidation is identified and there is no

evidence of pulmonary mass. There is tortuous course to the

trachea toward the right of midline. Note is made of

circumscribed retrosternal nodule measuring approximately 0.9 x

1.1 cm in the axial plane. There is no significant pleural or

pericardial fluid. No pathologic adenopathy is seen on the

noncontrasted images. There is no evidence of acute osseous

abnormality.



IMPRESSION:

 Mild dependent atelectasis and/or pneumonitis without evidence

of consolidating pneumonia. There is no significant third spacing

of fluid. 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

mediastinum.



Dictated by: 



  Dictated on workstation # [id number] crush pills for administration